# Patient Record
Sex: FEMALE | Race: WHITE | Employment: FULL TIME | URBAN - METROPOLITAN AREA
[De-identification: names, ages, dates, MRNs, and addresses within clinical notes are randomized per-mention and may not be internally consistent; named-entity substitution may affect disease eponyms.]

---

## 2019-08-19 ENCOUNTER — TRANSFERRED RECORDS (OUTPATIENT)
Dept: HEALTH INFORMATION MANAGEMENT | Facility: CLINIC | Age: 44
End: 2019-08-19

## 2019-09-05 ENCOUNTER — TRANSFERRED RECORDS (OUTPATIENT)
Dept: HEALTH INFORMATION MANAGEMENT | Facility: CLINIC | Age: 44
End: 2019-09-05

## 2019-11-18 ENCOUNTER — OFFICE VISIT (OUTPATIENT)
Dept: URGENT CARE | Facility: URGENT CARE | Age: 44
End: 2019-11-18
Payer: COMMERCIAL

## 2019-11-18 VITALS
DIASTOLIC BLOOD PRESSURE: 64 MMHG | BODY MASS INDEX: 21.77 KG/M2 | SYSTOLIC BLOOD PRESSURE: 122 MMHG | WEIGHT: 141 LBS | TEMPERATURE: 97.9 F | HEART RATE: 63 BPM | OXYGEN SATURATION: 99 %

## 2019-11-18 DIAGNOSIS — H53.2 DOUBLE VISION WITH BOTH EYES OPEN: Primary | ICD-10-CM

## 2019-11-18 PROCEDURE — 99202 OFFICE O/P NEW SF 15 MIN: CPT | Performed by: FAMILY MEDICINE

## 2019-11-18 ASSESSMENT — ENCOUNTER SYMPTOMS
SINUS PAIN: 1
DIZZINESS: 1
NECK PAIN: 1
HEADACHES: 1

## 2019-11-18 NOTE — PROGRESS NOTES
"SUBJECTIVE:   Katy Jean is a 44 year old female presenting with a chief complaint of   Chief Complaint   Patient presents with     Urgent Care     Eye Problem     Pt says she's having vision double vision and would like a referral for imaging. Pt says she follows \"homeopathic\" doctor and was told to see an MD.      Headache     Intermittent \"sinus\" headaches with neck stiffness x several months.      As above she has had symptoms that have gone on for several months.  The double vision that she has encountered has been fluctuating they will be times when she has double vision times and she does not,    She has had her eyes checked in the recent past and no abnormalities were found.  She does wear glasses.    Headaches have been ongoing although not intractable.    No lateralizing neurologic signs or symptoms no weakness in her face no weakness in her arms or legs.  She does ambulate without a problem there is.    A history of aneurysms in the family she relates her.    Problems to a previous history of having taken Depo Provera in the past.  Symptoms seem to arise since then she is been seeing a homeopathic physician.  This physician has been keeping her symptoms under control but as of late has discussed her seeing somebody for imaging.  In addition she would like to be seen at the [facility] and there are endocrinology clinic which does seem reasonable.  She is an established patient of [facility].        Review of Systems   HENT: Positive for sinus pain.    Musculoskeletal: Positive for neck pain.   Neurological: Positive for dizziness and headaches.   All other systems reviewed and are negative.      Past Medical History:   Diagnosis Date     Endometriosis      Factor V Leiden (H)      Group B streptococcal infection      No family history on file.  Current Outpatient Medications   Medication Sig Dispense Refill     HYDROCORTISONE PO Take 5 mg by mouth daily       Social History "     Tobacco Use     Smoking status: Never Smoker     Smokeless tobacco: Never Used   Substance Use Topics     Alcohol use: Yes     Comment: RARE       OBJECTIVE  /64   Pulse 63   Temp 97.9  F (36.6  C) (Tympanic)   Wt 64 kg (141 lb)   SpO2 99%   BMI 21.77 kg/m      Physical Exam  Vitals signs and nursing note reviewed.   HENT:      Head: Normocephalic and atraumatic.   Eyes:      Pupils: Pupils are equal, round, and reactive to light.   Neck:      Musculoskeletal: Normal range of motion.   Cardiovascular:      Rate and Rhythm: Normal rate.   Pulmonary:      Effort: Pulmonary effort is normal.      Breath sounds: Normal breath sounds.   Skin:     General: Skin is warm.   Neurological:      General: No focal deficit present.      Mental Status: She is alert and oriented to person, place, and time.      Gait: Gait normal.   Psychiatric:         Mood and Affect: Mood normal.         Behavior: Behavior normal.         Judgment: Judgment normal.         Labs:  No results found for this or any previous visit (from the past 24 hour(s)).    X-Ray was not done.    ASSESSMENT:      ICD-10-CM    1. Double vision with both eyes open H53.2 MR Brain w/o & w Contrast     NEUROLOGY ADULT REFERRAL     ENDOCRINOLOGY ADULT REFERRAL   2.  Headache    44-year-old female with a constellation of symptoms that does make you think they may be from a neurologic basis in addition she does have a history of aneurysms in the family.    In addition she is relating this to her previous hormonal treatment so I do think that in consultation as she has had with her homeopathic physician endocrinology may be good consultation to obtain    It does not appear that she is had any scanning in the past.  She may need an MRI MRA    She wants to be followed at the [facility]    Medical Decision Making:    Differential Diagnosis:  Aneurysm,, intracranial mass, MS    Serious Comorbid Conditions:  Adult:   None    PLAN:        Followup:    1.  [facility] neurology clinic  2 [facility] endocrinology clinic.   3.  Initial radiologic studies to be done through [facility] and at the discretion of the radiologist.    4.  If there is any further concerns please feel free to contact me.

## 2019-11-21 ENCOUNTER — TRANSFERRED RECORDS (OUTPATIENT)
Dept: HEALTH INFORMATION MANAGEMENT | Facility: CLINIC | Age: 44
End: 2019-11-21

## 2019-12-17 NOTE — TELEPHONE ENCOUNTER
RECORDS RECEIVED FROM: internal    DATE RECEIVED: 2.10.20   NOTES (FOR ALL VISITS) STATUS DETAILS   OFFICE NOTES from referring provider Care Everywhere 11.18.19 John Gill MD   OFFICE NOTES from other specialist N/A    ED NOTES N/A    OPERATIVE REPORT  (thyroid, pituitary, adrenal, parathyroid) N/A    MEDICATION LIST Internal    IMAGING      DEXASCAN N/A    MRI (BRAIN) Internal 11/21/19   XR (Chest) N/A    CT (HEAD/NECK/CHEST/ABDOMEN) N/A    NUCLEAR  N/A    ULTRASOUND (HEAD/NECK) N/A    LABS     DIABETES: HBGA1C, CREATININE, FASTING LIPIDS, MICROALBUMIN URINE, POTASSIUM, TSH, T4    THYROID: TSH, T4, CBC, THYRODLONULIN, TOTAL T3, FREE T4, CALCITONIN, CEA N/A

## 2020-01-11 ENCOUNTER — OFFICE VISIT (OUTPATIENT)
Dept: ENDOCRINOLOGY | Facility: CLINIC | Age: 45
End: 2020-01-11

## 2020-01-11 VITALS
BODY MASS INDEX: 21 KG/M2 | HEIGHT: 70 IN | DIASTOLIC BLOOD PRESSURE: 84 MMHG | WEIGHT: 146.7 LBS | SYSTOLIC BLOOD PRESSURE: 127 MMHG | HEART RATE: 68 BPM

## 2020-01-11 DIAGNOSIS — R53.83 FATIGUE, UNSPECIFIED TYPE: ICD-10-CM

## 2020-01-11 DIAGNOSIS — R79.89 LOW SERUM CORTISOL LEVEL: Primary | ICD-10-CM

## 2020-01-11 RX ORDER — ERGOCALCIFEROL 1.25 MG/1
50000 CAPSULE ORAL
COMMUNITY

## 2020-01-11 RX ORDER — CALCIUM CARBONATE/VITAMIN D3 500-10/5ML
LIQUID (ML) ORAL
COMMUNITY

## 2020-01-11 ASSESSMENT — PATIENT HEALTH QUESTIONNAIRE - PHQ9: SUM OF ALL RESPONSES TO PHQ QUESTIONS 1-9: 18

## 2020-01-11 ASSESSMENT — PAIN SCALES - GENERAL: PAINLEVEL: SEVERE PAIN (6)

## 2020-01-11 ASSESSMENT — MIFFLIN-ST. JEOR: SCORE: 1377.74

## 2020-01-11 NOTE — PATIENT INSTRUCTIONS
Go to the lab between 7 and 9 am for a blood draw. Any [facility] lab is okay.  Do not take hydrocortisone for at least 24 hour prior to this testing.                      Behavioral Health Clinician Information    We would like to introduce you to Isaac Urbina PsyD, LP our Behavioral Health Clinician ([facility]).  He works with the neurology, rheumatology, endocrinology, and gastroenterology clinics to help develop and maintain healthy behaviors while addressing any challenges that might get in the way of staying as healthy as possible such as stress, anxiety, and depression.    What will the [facility] add to the services I already receive?    Dr. Urbina uses a brief model of treatment to teach a set of practical skills to cope with stress, worries, or general distress about your health or other life concerns. He and your Physician will together consider the physical, behavioral, and emotional aspects of your health concerns. Dr. Urbina's primary job is to partner with you and your Physician to develop and implement the best integrated care plan for YOU!    Some examples of [facility] support include:    Stress Management  Anxiety/Panic/PTSD/Depression  Alcohol and Drug Concerns  Adjustment to Medical Diagnosis  Grief and Loss  Weight Management   Insomnia  Relationship and Parenting Issues  Chronic Pain  Chronic Disease Management    Who is eligible for services?  The service is available to all patients within the specialty clinics of rheumatology, neurology, endocrinology, and gastroenterology at [facility]. There may be a co-pay involved in some of the services that he provides. You will need to contact your insurance to determine specific coverage amounts and limits.    How do I schedule an appointment with (fill in first name)?  You can call scheduling line for [facility] at [phone] and ask to schedule an appointment with Dr. Urbina. Additionally, you can ask to speak with him while you are at  your clinic appointment. Although he may not always be immediately available, he will make his best effort to meet you before you leave the clinic.

## 2020-01-11 NOTE — PROGRESS NOTES
"Endocrinology and Diabetes Clinic    Consulting provider: John Gill MD  Reason for consultation: Low cortisol     Subjective:   Katy Jean is a 44 year old woman, here for evaluation of a low cortisol level.     She reports feeling well until 7 years ago, when she was treated with Lupron for endometriosis. She received 2 months of therapy. Everything felt different after this treatment. She describes that she had been a high energy, upbeat, high achieving person, but developed exhaustion and other symptoms. Current symptoms include fatigue, trouble with concentration, exercise intolerance (in particular, can't build muscle mass or tone), and joint aches.     She has been working with a Naturopathic Doctor (Franko Solomon, [facility]) and this has been very helpful. She was diagnosed and treated for small intestinal bacterial overgrowth with antibiotics and diet. She notes that diet makes a big difference in all of her symptoms.    She has also been diagnosed with low cortisol and low DHEA-s levels, and for this reason now takes hydrocortisone 10 mg every morning (she is also prescribed 5 mg in the afternoon, not currently taking). She also took DHEA for 3 months, but testosterone was high and she stopped taking this in September. In the past she has also stopped hydrocortisone and did not feel any worse without this medication.     She and her physician are worried about pituitary apoplexy as a side effect of Lupron. 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 This did not show any pituitary abnormality and was read as \"normal for age\" (result in scanned media).    Recent notes and lab results from [facility] were reviewed; available in scanned media.     Review of Systems:  Periods - regular until December, she did have a breakthrough period in December  Stretch marks - no "   Hyperpigmentation - no, but skin blothcy   Thin dry skin   Getting dark hairs on chin, no where else   Pubic hair normal for her  No axillary hair   Losing hair from head   Vision - staring at a computer screen and stress give her double vision and blurry vision; no difficulty with peripheral vision.   A comprehensive ROS was otherwise negative except as noted in HPI.     Medications:   Hydrocortisone 10 mg daily  Magnesium supplement  Vitamin D 97762 units once per week     Past Medical History:   Past Medical History:   Diagnosis Date     Endometriosis      Factor V Leiden (H)      Group B streptococcal infection      Past Surgical History:  Spinal fusion as teenager   Ovary removed   Laporoscopic surgeries for endometriosis   Knee surgery   Bilateral rhizotomy for back pain after surgery   Past Surgical History:   Procedure Laterality Date     ARTHROSCOPY KNEE Right      BREAST SURGERY       C ANESTH,LUMBAR SPINE,CORD SURGERY       DILATION AND CURETTAGE, OPERATIVE HYSTEROSCOPY WITH MORCELLATOR, COMBINED N/A 7/10/2015    Procedure: COMBINED DILATION AND CURETTAGE, OPERATIVE HYSTEROSCOPY WITH MORCELLATOR;  Surgeon: Deirdre Pinto MD;  Location: [facility]     EXTRACTION(S) DENTAL       GYN SURGERY       HERNIA REPAIR       LAPAROSCOPIC CYSTECTOMY OVARIAN (BENIGN) Left 7/10/2015    Procedure: LAPAROSCOPIC CYSTECTOMY OVARIAN (BENIGN);  Surgeon: Deirdre Pinto MD;  Location: [facility]     LAPAROSCOPIC LYSIS ADHESIONS  7/10/2015    Procedure: LAPAROSCOPIC LYSIS ADHESIONS;  Surgeon: Deirdre Pinto MD;  Location:  [facility]     LAPAROSCOPY       radiofrequency ablation of left greater saphenous vein       TONSILLECTOMY       Allergies:   No Known Allergies    Social History:   for [employer]   Degree in exercise physiology  Mom of 3 kids, ages 16, 14, 12  Social History     Tobacco Use     Smoking status: Never Smoker     Smokeless tobacco: Never Used   Substance Use Topics     Alcohol use: Yes     Comment:  "RARE     Family History:  No pertinent family history.     Physical Examination:  Blood pressure 127/84, pulse 68, height 1.765 m (5' 9.5\"), weight 66.5 kg (146 lb 11.2 oz), not currently breastfeeding.  Body mass index is 21.35 kg/m .    Wt Readings from Last 4 Encounters:   01/11/20 66.5 kg (146 lb 11.2 oz)   11/18/19 64 kg (141 lb)   07/10/15 62.6 kg (138 lb)     General: Well appearing and in no distress.   Eyes: EOMI. Sclerae and conjunctivae are clear. Visual fields are intact to confrontation.   HENT: No thyromegaly or mass.    Lymphatic: No cervical lymphadenopathy.  Cardiovascular: RRR, with normal S1+S2 and no murmurs.   Respiratory: Lungs are clear to auscultation.   Gastrointestinal: Abdomen is soft, non tender, and non-distended.   Skin: No abdominal striae. No hyperpigmentation of skin folds or buccal mucosa. No hirsuit features.   Extremities: No peripheral edema.   Neurologic: No tremor with hands outstretched. 2+ patellar reflexes.     Labs and Studies:   Recent outside lab work reviewed, available in scanned media.     There was also an endocrine workup in 2014 in [facility] - no consult note available and context of testing is not clear. Cortisol was 7.5 with ACTH 10. Cortisol at a later date was <1.0 (possibly a dex suppression test?). TSH low with normal free T4.     MRI result reviewed.     Assessment:  1. Low cortisol level on outside testing.   2. Low DHEA-s level.   3. Previous treatment with Lupron, prior to onset of current symptoms.  4. Vitamin D deficiency, which is treated.   5. High scores on depression and anxiety screens today. Will meet with nurse following this appointment per protocol. She was offered a referral to health psychology but did not feel this would be helpful at the current time. She attributes high scores to current physical symptoms.     Plan:   Will repeat cortisol testing and obtain a full panel of pituitary labs to screen for secondary adrenal insufficiency or " other pituitary dysfunction. Instructed she must hold hydrocortisone for 24 hours prior to this testing and should come to the lab in the early morning.   Further workup to be based on results. I will contact her when results return.     Porsha Alexis MD   Diabetes and Endocrinology   [phone]

## 2020-01-11 NOTE — NURSING NOTE
Depression Response    Patient completed the PHQ-9 assessment for depression and scored >9? No  Question 9 on the PHQ-9 was positive for suicidality? No    I personally notified the following: clinic nurse jem

## 2020-01-11 NOTE — NURSING NOTE
[facility]:  PHQ-9 Screening Note  SITUATION/BACKGROUND                                                    Katy Jean is a 44 year old female who completed the PHQ-9 assessment for depression and Score is >9.    Onset of symptoms: gradual  Trigger: 7-8 years ago was on a medication that has effected her hormones  Prior history of suicide attempt or self harm: No  Risk Factors: access to firearm  History of depression or mental illness: No  Medications reviewed: No     ASSESSMENT      A. Are any of the following present?      Suicidal thoughts with a plan and means to carry out the plan?    Intent to harm others    Altered mental status: confusion, delusional, psychotic NO - go to B   B. Are any of the following present?      Suicidal thoughts without a plan or means to carry out the plan    New onset of delusional ideas    Past inpatient admission for depression    New onset and recent change or addition of new medication NO - go to C   C. Are any of the following present?      Previous suicide attempts    Depression interfering with ability to work or function    Loss of appetite and eating poorly    Abrupt cessation of drugs (OTC or RX), alcohol or caffeine    Drug or alcohol abuse NO - go to D   D. Are several of the following present?      Difficulty concentrating    Difficulty sleeping    Reduced interest in sexual activity or impotency    Irregular or absent menstruation    No interest in activity    Change in interpersonal relationships    Increased use/abuse of alcohol or drugs    Pregnant or recent child birth    Recent major life change    History of depression NO - provide home care instructions         BEHAVIORAL HEALTH TEAMS      [facility] - Behavioral Health Team    [facility] Pager: [phone]    [place]  - Behavioral Health Team    Pager number: [phone]    Referral to Behavioral Health    BEHAVIORAL / SPIRITUAL HEALTH SOWQ [[id number]}    Jackie Garzon,  RN        Met with patient face to face in exam room to review PHQ-9 score. Patient denies suicidal thoughts or ideations. Patient reports that she wanted to be honest on the PHQ test but states that she does not feel she is depressed. Patient notes that her hopelessness has to do with her on going fatigue from her medical issues. Patient states that she has much hope other than her medical concerns. Patient states that she has a wonderful family, 3 teenage sons and a .  Patient stated that around 7-8 years ago she was put on depo-lupron for endometriosis and ever since her hormones have been off. Patient notes that she is constantly fatigued and will forget things while managing her family's schedules. Patient reports that her best outlet for stress management is exercise but she has not been able to do much exercise due to the fatigue.     Reviewed with patient option of Behavioral Health Clinician. Discussed with patient what a [facility] can provide for patient. She verbalizes understanding and notes that this time she would prefer to wait to see how her tests turn out and what Dr. Alexis's recommendations will be. Patient notes that she is not opposed and was ok with nurse providing information for [place] [facility]ician (see patient instructions).     Writer advised patient that nursing staff at [facility] will be updated on plan. Patient verbalizes understanding and agrees to plan. [facility] with any questions or concerns.           Taylor Garzon RN  Endocrine Care Coordinator  [facility]      Copyright 2016 [employer]

## 2020-01-14 ENCOUNTER — TELEPHONE (OUTPATIENT)
Dept: ENDOCRINOLOGY | Facility: CLINIC | Age: 45
End: 2020-01-14

## 2020-01-14 DIAGNOSIS — R79.89 LOW SERUM CORTISOL LEVEL: ICD-10-CM

## 2020-01-14 DIAGNOSIS — R53.83 FATIGUE, UNSPECIFIED TYPE: Primary | ICD-10-CM

## 2020-01-14 NOTE — TELEPHONE ENCOUNTER
----- Message from Jackie Garzon RN sent at 1/11/2020 10:34 AM CST -----  Hello ladies!    Just a follow-up on this patient. Please refer to my nursing note. I went in and talked to patient today regarding her PHQ-9 score. Patient did not want any further resources at this time. She wanted to wait to see how her tests turned out. I was not sure if you followed up with these patients.    Thank you!!!  Taylor

## 2020-01-14 NOTE — TELEPHONE ENCOUNTER
I called alicia to  see how she was doing and if she needed help in scheduling the labs. The orders  Have  pending future on them so the  lab had told her they were not approved by Dr Alexis.. I don't know how to change this status so  Ordered new ones with future   . I entered all the labs again on FUTURE so she can come in soon in the AM to have them done . She has been off the hydrocortisone  over 24 hours. Becki Garduno RN on 1/14/2020 at 2:08 PM

## 2020-01-16 DIAGNOSIS — R79.89 LOW SERUM CORTISOL LEVEL: ICD-10-CM

## 2020-01-16 DIAGNOSIS — R53.83 FATIGUE, UNSPECIFIED TYPE: ICD-10-CM

## 2020-01-16 LAB
ALBUMIN SERPL-MCNC: 3.5 G/DL (ref 3.4–5)
ALP SERPL-CCNC: 42 U/L (ref 40–150)
ALT SERPL W P-5'-P-CCNC: 19 U/L (ref 0–50)
ANION GAP SERPL CALCULATED.3IONS-SCNC: 5 MMOL/L (ref 3–14)
AST SERPL W P-5'-P-CCNC: 13 U/L (ref 0–45)
BILIRUB SERPL-MCNC: 0.5 MG/DL (ref 0.2–1.3)
BUN SERPL-MCNC: 19 MG/DL (ref 7–30)
CALCIUM SERPL-MCNC: 9.2 MG/DL (ref 8.5–10.1)
CHLORIDE SERPL-SCNC: 101 MMOL/L (ref 94–109)
CO2 SERPL-SCNC: 32 MMOL/L (ref 20–32)
CORTIS SERPL-MCNC: 8 UG/DL (ref 4–22)
CREAT SERPL-MCNC: 0.86 MG/DL (ref 0.52–1.04)
ESTRADIOL SERPL-MCNC: 127 PG/ML
FSH SERPL-ACNC: 30.8 IU/L
GFR SERPL CREATININE-BSD FRML MDRD: 81 ML/MIN/{1.73_M2}
GLUCOSE SERPL-MCNC: 91 MG/DL (ref 70–99)
LH SERPL-ACNC: 22.9 IU/L
POTASSIUM SERPL-SCNC: 3.6 MMOL/L (ref 3.4–5.3)
PROT SERPL-MCNC: 6.7 G/DL (ref 6.8–8.8)
SODIUM SERPL-SCNC: 138 MMOL/L (ref 133–144)
T4 FREE SERPL-MCNC: 0.92 NG/DL (ref 0.76–1.46)
TSH SERPL DL<=0.005 MIU/L-ACNC: 2.13 MU/L (ref 0.4–4)

## 2020-01-16 PROCEDURE — 82024 ASSAY OF ACTH: CPT | Performed by: INTERNAL MEDICINE

## 2020-01-16 PROCEDURE — 82533 TOTAL CORTISOL: CPT | Performed by: INTERNAL MEDICINE

## 2020-01-16 PROCEDURE — 84443 ASSAY THYROID STIM HORMONE: CPT | Performed by: INTERNAL MEDICINE

## 2020-01-16 PROCEDURE — 84439 ASSAY OF FREE THYROXINE: CPT | Performed by: INTERNAL MEDICINE

## 2020-01-16 PROCEDURE — 82627 DEHYDROEPIANDROSTERONE: CPT | Performed by: INTERNAL MEDICINE

## 2020-01-16 PROCEDURE — 80053 COMPREHEN METABOLIC PANEL: CPT | Performed by: INTERNAL MEDICINE

## 2020-01-16 PROCEDURE — 84305 ASSAY OF SOMATOMEDIN: CPT | Performed by: INTERNAL MEDICINE

## 2020-01-16 PROCEDURE — 83001 ASSAY OF GONADOTROPIN (FSH): CPT | Performed by: INTERNAL MEDICINE

## 2020-01-16 PROCEDURE — 82670 ASSAY OF TOTAL ESTRADIOL: CPT | Performed by: INTERNAL MEDICINE

## 2020-01-16 PROCEDURE — 36415 COLL VENOUS BLD VENIPUNCTURE: CPT | Performed by: INTERNAL MEDICINE

## 2020-01-16 PROCEDURE — 83002 ASSAY OF GONADOTROPIN (LH): CPT | Performed by: INTERNAL MEDICINE

## 2020-01-17 LAB
ACTH PLAS-MCNC: <10 PG/ML
DHEA-S SERPL-MCNC: 26 UG/DL (ref 35–430)
IGF-I BLD-MCNC: 233 NG/ML (ref 64–246)

## 2020-01-23 ENCOUNTER — TELEPHONE (OUTPATIENT)
Dept: ENDOCRINOLOGY | Facility: CLINIC | Age: 45
End: 2020-01-23

## 2020-01-23 DIAGNOSIS — E27.40 ADRENAL INSUFFICIENCY (H): ICD-10-CM

## 2020-01-23 DIAGNOSIS — R79.89 LOW SERUM ADRENOCORTICOTROPHIC HORMONE (ACTH): ICD-10-CM

## 2020-01-23 DIAGNOSIS — R79.89 LOW SERUM CORTISOL LEVEL: Primary | ICD-10-CM

## 2020-01-23 NOTE — TELEPHONE ENCOUNTER
Attempted to contact patient to discuss lab results; left message.   Porsha Alexis MD  Endocrinology and Diabetes   [phone]  Component      Latest Ref Rng & Units 1/16/2020   Sodium      133 - 144 mmol/L 138   Potassium      3.4 - 5.3 mmol/L 3.6   Chloride      94 - 109 mmol/L 101   Carbon Dioxide      20 - 32 mmol/L 32   Anion Gap      3 - 14 mmol/L 5   Glucose      70 - 99 mg/dL 91   Urea Nitrogen      7 - 30 mg/dL 19   Creatinine      0.52 - 1.04 mg/dL 0.86   GFR Estimate      >60 mL/min/1.73:m2 81   GFR Estimate If Black      >60 mL/min/1.73:m2 >90   Calcium      8.5 - 10.1 mg/dL 9.2   Bilirubin Total      0.2 - 1.3 mg/dL 0.5   Albumin      3.4 - 5.0 g/dL 3.5   Protein Total      6.8 - 8.8 g/dL 6.7 (L)   Alkaline Phosphatase      40 - 150 U/L 42   ALT      0 - 50 U/L 19   AST      0 - 45 U/L 13   T4 Free      0.76 - 1.46 ng/dL 0.92   TSH      0.40 - 4.00 mU/L 2.13   DHEA Sulfate      35 - 430 ug/dL 26 (L)   Ins Growth Factor 1      64 - 246 ng/ml 233   FSH      IU/L 30.8   Lutropin      IU/L 22.9   Estradiol      pg/mL 127   Adrenal Corticotropin      <47 pg/mL <10   Cortisol Serum      4 - 22 ug/dL 8.0

## 2020-01-29 PROBLEM — E27.40 ADRENAL INSUFFICIENCY (H): Status: ACTIVE | Noted: 2020-01-29

## 2020-01-29 PROBLEM — R79.89 LOW SERUM ADRENOCORTICOTROPHIC HORMONE (ACTH): Status: ACTIVE | Noted: 2020-01-29

## 2020-01-29 PROBLEM — R79.89 LOW SERUM CORTISOL LEVEL: Status: ACTIVE | Noted: 2020-01-29

## 2020-01-29 RX ORDER — COSYNTROPIN 0.25 MG/ML
250 INJECTION, POWDER, FOR SOLUTION INTRAMUSCULAR; INTRAVENOUS ONCE
Status: CANCELLED | OUTPATIENT
Start: 2020-01-29

## 2020-01-29 NOTE — TELEPHONE ENCOUNTER
Spoke with patient today. Reviewed lab results. Next step will be ACTH stim test. All questions answered.  Of note, she did not restart her hydrocortisone and actually feels better without this medication.   She also has noticed that symptoms seem related to menstrual cycles.   I confirmed medication list - vitamin D and magnesium. No opiates.   Porsha Alexis MD  Endocrinology and Diabetes   [phone]

## 2020-01-31 ENCOUNTER — INFUSION THERAPY VISIT (OUTPATIENT)
Dept: INFUSION THERAPY | Facility: CLINIC | Age: 45
End: 2020-01-31
Attending: INTERNAL MEDICINE
Payer: COMMERCIAL

## 2020-01-31 VITALS
HEART RATE: 53 BPM | TEMPERATURE: 98.2 F | DIASTOLIC BLOOD PRESSURE: 72 MMHG | RESPIRATION RATE: 16 BRPM | SYSTOLIC BLOOD PRESSURE: 108 MMHG

## 2020-01-31 DIAGNOSIS — E27.40 ADRENAL INSUFFICIENCY (H): ICD-10-CM

## 2020-01-31 DIAGNOSIS — R79.89 LOW SERUM ADRENOCORTICOTROPHIC HORMONE (ACTH): ICD-10-CM

## 2020-01-31 DIAGNOSIS — R79.89 LOW SERUM CORTISOL LEVEL: Primary | ICD-10-CM

## 2020-01-31 LAB
CORTICOSTER 1H P 250 UG ACTH SERPL-SCNC: 28 UG/DL
CORTICOSTER 30M P 250 UG ACTH SERPL-SCNC: 23 UG/DL
CORTICOSTER SERPL-MCNC: 6.4 UG/DL (ref 4–22)
PROLACTIN SERPL-MCNC: 11 UG/L (ref 3–27)

## 2020-01-31 PROCEDURE — 82024 ASSAY OF ACTH: CPT | Performed by: INTERNAL MEDICINE

## 2020-01-31 PROCEDURE — 36415 COLL VENOUS BLD VENIPUNCTURE: CPT

## 2020-01-31 PROCEDURE — 96374 THER/PROPH/DIAG INJ IV PUSH: CPT

## 2020-01-31 PROCEDURE — 25000128 H RX IP 250 OP 636: Mod: ZF | Performed by: INTERNAL MEDICINE

## 2020-01-31 PROCEDURE — 84146 ASSAY OF PROLACTIN: CPT | Performed by: INTERNAL MEDICINE

## 2020-01-31 PROCEDURE — 82533 TOTAL CORTISOL: CPT | Performed by: INTERNAL MEDICINE

## 2020-01-31 RX ORDER — COSYNTROPIN 0.25 MG/ML
250 INJECTION, POWDER, FOR SOLUTION INTRAMUSCULAR; INTRAVENOUS ONCE
Status: COMPLETED | OUTPATIENT
Start: 2020-01-31 | End: 2020-01-31

## 2020-01-31 RX ORDER — COSYNTROPIN 0.25 MG/ML
250 INJECTION, POWDER, FOR SOLUTION INTRAMUSCULAR; INTRAVENOUS ONCE
Status: CANCELLED | OUTPATIENT
Start: 2020-01-31

## 2020-01-31 RX ADMIN — COSYNTROPIN 250 MCG: 0.25 INJECTION, POWDER, LYOPHILIZED, FOR SOLUTION INTRAVENOUS at 09:12

## 2020-01-31 NOTE — PATIENT INSTRUCTIONS
Dear Katy Jean    Thank you for choosing [facility] ([facility]) for your ACTH blood testing.  The following information is a summary of our appointment as well as important reminders.      We look forward in seeing you on your next appointment here at [facility]).  Please don t hesitate to call us at [phone] to reschedule any of your appointments or to speak with one of the [facility] registered nurses.  It was a pleasure taking care of you today.    Sincerely,    [facility]  [address] [place] [address]  [id number]  Phone:  [phone]  Patient Education     ACTH (Blood)  Does this test have other names?  Adrenocorticotropic hormone blood test, corticotropin  What is this test?  This is a blood test that measures the amount of adrenocorticotropic hormone (ACTH) the pituitary gland produces. This gland is a tiny organ that sits just below your brain. It secretes adrenocorticotropic hormone, which controls the production of another hormone called cortisol.  Cortisol is made by your adrenal glands, which are located at the top of your kidneys. Cortisol helps break down protein, sugar, and fat in your food. It also helps to regulate your blood pressure and your body's ability to fight infection. Cortisol is also one of the hormones that helps you deal with stress. Cortisol levels should peak in the morning and be at their lowest in the evening.  Why do I need this test?  You might have this test if your healthcare provider suspects you have hormone problems. This test is used along with other tests to diagnose conditions, including:    Cushing disease, in which the pituitary gland makes too much ACTH    Cushing syndrome, in which the adrenal glands make too much cortisol    Colton disease, in which the adrenal glands don't make enough  cortisol    Hypopituitarism, a disorder of the pituitary gland that keeps it from producing enough vital hormones  What other tests might I have along with this test?  Your healthcare provider also might order tests to measure your cortisol levels, including:    Adrenocorticotropic hormone stimulation test to determine why your adrenal glands aren't working properly    Dexamethasone suppression test to find an underlying reason for Cushing    Adrenocorticotropic hormone stimulation with metyrapone test also to find an underlying reason for Cushing  What do my test results mean?  Many things may affect your lab test results. These include the method each lab uses to do the test. Even if your test results are different from the normal value, you may not have a problem. To learn what the results mean for you, talk with your healthcare provider.  ACTH is measured in picograms per milliliter (pg/mL). Test results are influenced by the time of day the test was done. Normal results are:    Adults: 6-76 pg/ml (1.3-16.7 pmol/L)  If your ACTH level is low or high you may have Cushing syndrome. If your ACTH level is high you may have Aguada disease. A low ACTH level can also be an indication of hypopituitarism.  How is this test done?  The test requires a blood sample, which is drawn through a needle from a vein in your arm.  You may have blood drawn in the morning and in the afternoon or evening. This is to check for variations in your levels of ACTH hormone.  Does this test pose any risks?  Taking a blood sample with a needle carries risks that include bleeding, infection, bruising, or feeling dizzy. When the needle pricks your arm, you may feel a slight stinging sensation or pain. Afterward, the site may be slightly sore.  What might affect my test results?  Your test results might be affected if you:    Are under a great deal of stress    Recently had a trauma    Are menstruating or pregnant    Are taking certain  medicines, including steroids, hormones, or insulin    Did not sleep well the night before the test  How do I get ready for this test?  Don't eat after midnight on the day of your test. Get a good night's sleep. Follow any other directions from your healthcare provider. Be sure your healthcare provider knows about all medicines, herbs, vitamins, and supplements you are taking. This includes medicines that don't need a prescription and any illicit drugs you may use.    7184-2454 [employer]. [address] [place] [address], [place] [address]. All rights reserved. This information is not intended as a substitute for professional medical care. Always follow your healthcare professional's instructions.

## 2020-01-31 NOTE — PROGRESS NOTES
Cosyntropin Stimulation Study Nursing Note    Katy Jean comes to [facility] today for a ACTH timed test. Pt's vitals recorded  and entered into flow sheet. Medications recorded on MAR. Access recorded in flow sheet.    Progress Note  Vitals were reviewed   Patient tolerated the procedure well    Note: RN provided patient with educational handout regarding timed test.  RN confirmed patient did not take steroids (hydrocortisone) on the morning of the test. PIV placed and  baseline labs drawn. RN administered Cortrosyn per IV push followed by 2 mls NS. Cortisol labs drawn again at 30 minutes and 60 minutes. Following test patient instructed to take usual dose of hydrocortisone for the day.    Medication given:  Administrations This Visit     cosyntropin (CORTROSYN) injection 250 mcg     Admin Date  01/31/2020 Action  Given Dose  250 mcg Route  Intravenous Administered By  Patricia Lovnig RN              Discharge Plan  Discharge instructions reviewed with patient: YES  Patient/Representative verbalized understanding, all questions answered: YES  Discharged from unit at 1030 with whom: self to home.    Patricia Loving RN

## 2020-02-04 LAB — ACTH PLAS-MCNC: <10 PG/ML

## 2020-02-10 ENCOUNTER — PRE VISIT (OUTPATIENT)
Dept: ENDOCRINOLOGY | Facility: CLINIC | Age: 45
End: 2020-02-10

## 2020-02-16 ENCOUNTER — HEALTH MAINTENANCE LETTER (OUTPATIENT)
Age: 45
End: 2020-02-16

## 2020-06-11 ENCOUNTER — TELEPHONE (OUTPATIENT)
Dept: OTHER | Facility: OUTPATIENT CENTER | Age: 45
End: 2020-06-11

## 2020-06-11 NOTE — TELEPHONE ENCOUNTER
LVM regarding therapy plan discussed with spouse in session yesterday. Reviewed clinic's philosophy on working with partners and offered pt the opportunity to answer questions about her upcoming appointment.

## 2020-06-22 ENCOUNTER — VIRTUAL VISIT (OUTPATIENT)
Dept: OTHER | Facility: OUTPATIENT CENTER | Age: 45
End: 2020-06-22
Payer: COMMERCIAL

## 2020-06-22 DIAGNOSIS — F43.23 ADJUSTMENT DISORDER WITH MIXED ANXIETY AND DEPRESSED MOOD: Primary | ICD-10-CM

## 2020-06-23 NOTE — PROGRESS NOTES
[facility]      Program in Human Sexuality  Department of Family Medicine & Community Health  [facility]   [address]               [place] [address]  Phone: [phone]  Fax: [phone]  [email or web address]    Video start time: 3:33 pm  Video end time: 4:28 pm    There were 4 disconnections during this session. It took a few minutes to reconnect each time--which made the flow of the diagnostic difficult. Also could not get reconnected and to use doxy for the final 25 minutes. During the doxy portion there was a lot of background/interference noise which also made the session difficult. Thus not much of the actual diagnostic assessment questions were completed.    Telemedicine Visit: The patient's condition can be safely assessed and treated via synchronous audio and visual telemedicine encounter.      Reason for Telemedicine Visit: Covid-19    Originating Site (Patient Location): Patient's home    Distant Site (Provider Location): Provider Remote Setting    Consent:  The patient/guardian has verbally consented to: the potential risks and benefits of telemedicine (video visit) versus in person care; bill my insurance or make self-payment for services provided; and responsibility for payment of non-covered services.     Mode of Communication:  Video Conference via [employer]    As the provider I attest to compliance with applicable laws and regulations related to telemedicine.          General Diagnostic Assessment Interview    Note: All information described in this report has been directly reported by the patient in the session(s) (unless specifically noted) except for Strengths & Liabilities, Mental Status, Multi-Axial Diagnosis, and Conclusions/Recommendations/Initial Treatment Goals.     Limits of confidentiality were reviewed with the client. She  verbally acknowledged understanding these limits.      Date of Service: 6/22/20  Client  Name: Katy Jean  YOB: 1975  45 year old  MRN:  [id number]  Gender/Gender Identity: female/feminine  Treating Provider: Michelle Guidry PsyD, LP  Program:  [facility]  Type of Session: Assessment  Present in Session: client  Number of Minutes:  55      Ethnicity:    Any relevant cultural/Yazidism issues?  none       Referral Source none    Chief Complaint/Presenting Problem and Goals:  History of Presenting Problem/Illness:    Client shared that she was referred to see me by her 's primary therapist at [facility].  Client seemed quite angry about having this appointment and so some time was spent discussing this issue.  She shared she did not feel like there was a discussion she was just told to schedule with me.  She shared she has been frustrated with therapy because when her  sees therapist he cons them and then no changes are made.  She shared how she did not feel good about the meeting with her  and will if the recent therapist.  She now knows her  is being transferred to a new therapist and she is concerned about this continuing pattern.  Discussed with client that she is not obligated to do therapy with me or anyone else at the clinic.  Discussed the purpose of a partner seeing someone for therapy to assist in addressing the impact of the compulsive sexual behavior on the partner.  She talked about her missed trust with the program because her  has not done any of the work recommended to him.  She shared about him spending a week with Dr. Trevizo while at his program and when he came back the agreement was to work on these assignments every day.  She shared that he has not worked on assignments at all.  She shared that promise also was that he would move out of the home when he returned but when he returned he told her that he was not leaving and if she left he had ways to control the money.  She shared some about previous couples therapy  where she felt like no progress was made because he was not willing to change.    She indicated starting to have concerns about his sexual behavior in 2004.  Regarding his sexual behaviors she is aware of he has had affairs and sexual partners.  She does not believe that she knows all of the information about what he has done and he is defensive when she talks about it with him.  She is not sure she wants to continue in the marriage but she is not sure that she will be able to get out of the relationship either.  She shared it is difficult because they have 3 children and now with the pandemic it has affected everyone in the household with having to work from home.          Mental Health History:     Client is engaged in previous mental health therapy with both individual therapy and couples therapy in the past.  The last time she did couples therapy was fall 2019 and that did not last very long.  She shared she gets quite frustrated doing couples therapy because her  can be charming and turns everything around on her.  She shared she has never been on any medications for mental health treatment.  She has no history of hospitalizations.  No suicide ideation or attempts in the past or present.  Her current symptoms of anxiety and depression seem to specifically relate to the distress in the relationship with her 's pattern of sexual behaviors and her feeling intimidated when she interacts with him.    Substance Use:     Client indicated having an alcoholic beverage about 1 time every 2 weeks.  At times she may have 2 drinks when she drinks.  She does not use any mood altering drugs.  She has never had any concern about her drug or alcohol use.  No one has expressed concern about her alcohol use in the past.  There is a family history of alcoholism with her maternal grandfather.  There is no other family history of alcohol or drug addiction.      Medical History:     Client indicated that she has  endometriosis.  They tried to treat it with some medication but she had side effects from the medication.  She is also experienced adrenal fatigue.  She has some additional health issues and she relates all this to the stress of being in this relationship and her 's pattern of behaviors.    Relationships/Social History  Family History:     Client was reared by her biological parents in a [place] town in [place] area in [place].  She has 2 siblings an older and younger brother.  Her father  of heart attack/aneurysm about 5 years ago.  He was 67 years old at that time.  She described having a loving relationship with her father.  She described him as being firm but always these caring.  She shared there were some difficulties with when she was between ages 11 and 13 but for the most part they always got along well.  Her mom, age 70, was also from the loving.  She shared they have a close connection in great relationship.  She has good relationships with both of her brothers.  She has regular contact with family members.  She indicated no abuse within her family system.    After high school she completed a bachelor's degree in exercise physiology at [employer].    Client indicated to having a couple of relationships that lasted about 3 years prior to meeting her  in about .  They were  a couple of years later and she had started having concerns about his behavior in .  She would start to find women's underwear, he would be gone many hours of the day and night, and would be defensive when she would asked about it.  She thinks he was being sexual with other women and possibly some of these were long-term affairs.  She shared that he is always liked going to strip clubs.  She also knew that he was looking at pornography frequently.  She shared he was never available and was never home.  He always spent time with his friends and she thinks now it is other women as  well.  She shared she also has concerns about his alcohol use as she sees him drinking a lot.  She sees a connection between his alcohol use and his seeking other women.    They have 3 children a son age 16, a daughter age 15, and another son age 12.  She described having a good relationship with her children and stated that in general her children have an okay relationship with her father.  She witnesses him arguing with them frequently.      Educational History client has a bachelor's degree in exercise physiology.  Occupational history information was not gathered about her current employment.      Legal Issues none          CONCLUSIONS    Strengths and Liabilities:     Client described her strengths as being empathic in being able to take others perspective.  She also is persistent and determined.  She described her limitations as her temper and difficulty with patience.    Symptoms:      Mental Status:   Appearance:  Distressed, Casually dressed and Well groomed  Behavior/relationship to examiner/demeanor:  Cooperative and Resistant  Orientation: Oriented to person, place, time and situation  Speech Rate:  Normal  Speech Spontaneity:  Normal  Mood:  frustrated, anxious and apprehensive  Affect:  Appropriate/mood-congruent  Thought Process (Associations):  Logical  Thought Content:  no evidence of suicidal or homicidal ideation and no overt psychosis  Abnormal Perception:  None  Attention/Concentration:  Normal  Language:  Intact  Insight:  Good  Judgment:  Good    Motor activity/EPS:  Normal      DSM-5 Diagnosis:    Encounter Diagnosis   Name Primary?     Adjustment disorder with mixed anxiety and depressed mood Yes         Conclusions/Recommendations/Initial Treatment Goals:   Katy Jean is a 45 year old American person assigned female at birth who identifies as female who [facility] regarding relationship distress related to her 's  pattern of engaging in sexual behavior outside the relationship.  She also has felt intimidated by him with his expression of anger.  She was very reluctant and resistant to engaging in the therapy process during the initial part of this interview.  She expressed concern about the treatment her  was getting as she sees him as being able to con therapists.  She shared not wanting to engage in therapy again to do work when there are no changes.  Discussed the purpose of our therapy is to help her look at the impact of this behavior on her and assist her in making decisions regarding her relationship.  Generally described the philosophy of the program.  She then seemed more open to engaging in shared information with me.  Client has no history of anxiety and depression or mental health diagnoses.  She has experienced some medical issues as a result of the stress she experiences in her relationship.  Individual therapy is recommended to address the impact this relationship has had on her.  Also was assisted client in creating and communicating boundaries in her relationship.        Interactive Complexity  Communication difficulties present during current the psychiatric procedure included:    1. N/A    Michelle Guidry PsyD, LP

## 2020-06-25 ENCOUNTER — VIRTUAL VISIT (OUTPATIENT)
Dept: OTHER | Facility: OUTPATIENT CENTER | Age: 45
End: 2020-06-25
Payer: COMMERCIAL

## 2020-06-25 DIAGNOSIS — F43.23 ADJUSTMENT DISORDER WITH MIXED ANXIETY AND DEPRESSED MOOD: Primary | ICD-10-CM

## 2020-06-29 NOTE — PROGRESS NOTES
Video start time:5:02 pm Had continuous video and audio problems.  Video end time: 5:30 pm the rest of session was done by phone. 5:30 pm to 6:00 pm    Telemedicine Visit: The patient's condition can be safely assessed and treated via synchronous audio and visual telemedicine encounter.      Reason for Telemedicine Visit: Covid 19    Originating Site (Patient Location): Patient's home    Distant Site (Provider Location): Provider Remote Setting    Consent:  The patient/guardian has verbally consented to: the potential risks and benefits of telemedicine (video visit) versus in person care; bill my insurance or make self-payment for services provided; and responsibility for payment of non-covered services.     Mode of Communication:  Video Conference via [employer]    As the provider I attest to compliance with applicable laws and regulations related to telemedicine.      [facility] -  Case Progress Note    Date of Service: Jun 25, 2020  Client Name: Katy Jean  YOB: 1975  MRN:  [id number]  Treating Provider: Michelle Guidry LP  Type of Session: Individual  Present in Session: client  Number of Minutes:  58    Current Symptoms/Status:    Anxiety, worry, distressing conflict and marital relationship, mistrust of her , distress about his long-term pattern of engaging in sexual behavior outside the relationship, anxiety and fear about his expression of anger if she does something he does not like.    Progress Toward Treatment Goals:   Started working on treatment plan.    Intervention: Modality and Description:  Provided support and feedback.  Used CBT to process the diagnostic session and to discuss treatment recommendations.  Client shared that her  has his first session with a new therapist Friday and she is supposed to attend that session.  She shared being anxious about attending the sessions because she feels like her  lives to therapists and  they believe him.  We discussed what she felt was important to share at this meeting.  She brought up some of the topics and assisted her in reframing these so that she can talk about specific behaviors and incidents.  Discussed with her there is no guarantee that he will change.  Reviewed that her purpose is to communicate with things she needs to and then to observe how he responds to this information.  If he makes changes that will be clear and obvious and if he does not she will need to make decisions about where she wants to go with this relationship and self-care.  We also discussed her decision about continuing with ongoing therapy.  She said at this time she is willing to engage in therapy.    Response to Intervention:  Client reported gaining insight and validation.    Assignment:  Share her concerns and session with 's therapist.    Interactive Complexity:  There are four specific communication difficulties that complicate the work of the primary psychiatric procedure.  Interactive complexity (+[id number]) may be reported when at least one of these difficulties is present.    Communication difficulties present during current the psychiatric procedure include:  1. None.    Diagnosis:  Encounter Diagnosis   Name Primary?     Adjustment disorder with mixed anxiety and depressed mood Yes         Plan / Need for Future Services:  Return for therapy in 1-2 weeks.      Michelle Guidry PsyD, LP

## 2020-07-01 ENCOUNTER — VIRTUAL VISIT (OUTPATIENT)
Dept: OTHER | Facility: OUTPATIENT CENTER | Age: 45
End: 2020-07-01
Payer: COMMERCIAL

## 2020-07-01 DIAGNOSIS — F43.23 ADJUSTMENT DISORDER WITH MIXED ANXIETY AND DEPRESSED MOOD: Primary | ICD-10-CM

## 2020-07-02 NOTE — PROGRESS NOTES
[facility] Sexual Health -  Case Progress Note      Client Name: Katy Jean  YOB: 1975  MRN:  [id number]  Treating Provider: Michelle Guidry LP  Type of Session:   Video start time:11:02 am  Video end time: 11:57 am    Telemedicine Visit: The patient's condition can be safely assessed and treated via synchronous audio and visual telemedicine encounter.      Reason for Telemedicine Visit: Covid 19    Originating Site (Patient Location): Patient's home    Distant Site (Provider Location): Provider Remote Setting    Consent:  The patient/guardian has verbally consented to: the potential risks and benefits of telemedicine (video visit) versus in person care; bill my insurance or make self-payment for services provided; and responsibility for payment of non-covered services.     Mode of Communication:  Video Conference via [employer]    As the provider I attest to compliance with applicable laws and regulations related to telemedicine.      [facility] Sexual Health -  Case Progress Note    Client Name: Katy Jean  YOB: 1975  MRN:  [id number]  Treating Provider: Michelle Guidry LP  Type of Session: Individual  Present in Session: client  Number of Minutes:  57    Date of Service::7/01/20        Current Symptoms/Status:    Anxiety, worry, distressing conflict and marital relationship, mistrust of her , distress about his long-term pattern of engaging in sexual behavior outside the relationship, anxiety and fear about his expression of anger if she does something he does not like.    Progress Toward Treatment Goals:   Completed treatment plan.    Intervention: Modality and Description:  Provided support and feedback.  Used CBT to process ongoing anxiety and depression related to 's pattern of being sexual outside the marriage.  She shared she did attend the meeting with his therapist on Friday.  She shared that her  was on video inside  the home and she was not audio inside the car.  She shared that she was not given time to share the concerns and information that she had outlined for this session.  She shared the new therapist asked a lot of questions and she does understand that she will be involved in future sessions.  We processed recent interaction with him.  Also discussed who she confided in.  She shared that her family members only no a limited amount of information and she feels concerned about telling them more.  We discussed how she can work on not getting engaged when he makes critical statements to her.  She discussed that this will be very difficult for her, but agreed to the guidelines and how to do it that we discussed.    Response to Intervention:  Client reported gaining insight and validation.    Assignment:  Change response to 's statements as discussed.    Interactive Complexity:  There are four specific communication difficulties that complicate the work of the primary psychiatric procedure.  Interactive complexity (+[id number]) may be reported when at least one of these difficulties is present.    Communication difficulties present during current the psychiatric procedure include:  1. None.    Diagnosis:  Encounter Diagnosis   Name Primary?     Adjustment disorder with mixed anxiety and depressed mood Yes         Plan / Need for Future Services:    TREATMENT PLAN    Date of Treatment Plan 2020  Name: Katy Jean  : 1975  Medical Record Number: [id number]  Treating Provider: Michelle Guidry PsyD, LP  Type of Session: Individual  Present in Session: client      Current Status:    Depression/Mood:  Sad  Irritable  Decreased Concentration    Anxiety/Panic:  Worry  Palpitations  Sweating  Fear Loss/Control    Thought:   Reports no problems or symptoms at this time    Sensorium:  Reports no problems or symptoms at this time    Behavior/Health:  Relationship Problems    Chemical Use:  Denies both  Alcohol and Drug Abuse    Sexual Problems:  Reports no problems or symptoms at this time    Suicide Risk Assessment:  Assessed Level of Immediate Risk:  YES  Ideation:  NO  Plan:  NO  Means:  NO  Intent:  NO    Homicide Risk Assessment:  Assessed Level of Immediate Risk:  YES  Ideation:  NO  Plan:  NO  Means:  NO  Intent:  NO    Impact of Symptoms on Function:  Decreased Physical/Health Status  Decreased Social/Family Function    DSM-5 Diagnoses:   Diagnoses       Codes Comments    Adjustment disorder with mixed anxiety and depressed mood    -  Primary F43.23           Screening Questionnaires:  Please indicate whether the following screening questionnaires have been completed:  CAGE:   PHQ-9:   BRAYAN-7: No  Safety Screening: Yes  WHODAS: No  Other:     Problem(s):  1. Anxiety, depression and distress in relationship due to long-term pattern of 's infidelity  2.   3.     Short-Long Term Goals  Increase Coping  Change Cognitions  Increase Psychosocial/Support Functioning  Improve Communication    Interventions  Cognitive-Behavioral Therapy  Behavior Therapy    Expected Outcomes and Prognosis  Return to normal functioning    Anticipated Treatment Duration:  Unknown    Frequency of Sessions  2 x / Month    Progress Update (for Plan Update Only)  NA:  1st Treatment Planning    Other Specific Goal Objectives for Treatment:  none    Current Psychoactive Medications:  Not Applicable    Discharge & Aftercare Goals: To Be Determined.    Care Coordination: Yes    Consent to Treatment:     Patient participated in this treatment planning process and indicated verbal agreement with the above treatment plan.    Patient Signature: verbal  Date: 7/01/2020    Provider Signature: Michelle Guidry PsyD, LP    Date: 7/01/2020        Return for therapy in 1-2 weeks.      Michelle Guidry PsyD, LP

## 2020-07-15 ENCOUNTER — VIRTUAL VISIT (OUTPATIENT)
Dept: OTHER | Facility: OUTPATIENT CENTER | Age: 45
End: 2020-07-15
Payer: COMMERCIAL

## 2020-07-15 DIAGNOSIS — F43.23 ADJUSTMENT DISORDER WITH MIXED ANXIETY AND DEPRESSED MOOD: Primary | ICD-10-CM

## 2020-07-20 NOTE — PROGRESS NOTES
[facility] Sexual Health -  Case Progress Note      Client Name: Katy Jean  YOB: 1975  MRN:  [id number]  Treating Provider: Michelle Guidry LP  Type of Session:   Video start time:12:02 am  Video end time: 12:58 am    Telemedicine Visit: The patient's condition can be safely assessed and treated via synchronous audio and visual telemedicine encounter.      Reason for Telemedicine Visit: Covid 19    Originating Site (Patient Location): Patient's home    Distant Site (Provider Location): Provider Remote Setting    Consent:  The patient/guardian has verbally consented to: the potential risks and benefits of telemedicine (video visit) versus in person care; bill my insurance or make self-payment for services provided; and responsibility for payment of non-covered services.     Mode of Communication:  Video Conference via [employer] We were disconnected part way through session and completed with telephone session.    As the provider I attest to compliance with applicable laws and regulations related to telemedicine.      [facility] Sexual Health -  Case Progress Note    Client Name: Katy Jean  YOB: 1975  MRN:  [id number]  Treating Provider: Michelle Guidry LP  Type of Session: Individual  Present in Session: client  Number of Minutes:  57    Date of Service::7/15/20        Current Symptoms/Status:    Intense anxiety, feeling overwhelmed, extreme distress in relationship, feeling as if no one believes her about her , fear and worry about what he will do if she tries to get out of the relationship, anxiety, worry, distressing conflict and marital relationship, mistrust of her , distress about his long-term pattern of engaging in sexual behavior outside the relationship, anxiety and fear about his expression of anger if she does something he does not like.    Progress Toward Treatment Goals:   Client reported some progress with stopping  herself from engaging in conflict when it escalates.    Intervention: Modality and Description:  Provided support and feedback.  Used CBT to process intense anxiety and distress about her marital relationship.  She shared that after her  had his first individual therapy session with his therapist things became much worse for her.  Her  told her that the therapist agreed with him about not moving out of the house.  Her  also said that he received positive feedback about the work that he has been doing.  Her  also asked her for a list of things that she wants or needs from the relationship.  Client started sobbing before she could complete sharing this information with me.  She shared it was her fear that her  would not be held accountable for his behaviors and once again he would be able to use this to manipulate her.  She sobbed throughout most of the session talking about how difficult it is going to be to talk with him.  She shared that he has not done any work or made any changes and yet he is able to make it really therapists and get positive feedback.  She shared knowing that she needs to get out of the relationship but fears his response to her taking steps to leave.  She believes that he will try to manipulate her and the children in every way possible to make this difficult for her.  She shared in the past she has just gotten to the point of giving up and staying because it was easier.  We discussed not engaging in debates with him about these topics.  Encouraged her to seek out the support of her brothers and share more with them about what is going on in her home.  Also discussed just starting with consulting an  about her rights and the process.  Encouraged client to contact me if she needs another session or support prior to our next meeting.    Response to Intervention:  Client reported gaining insight and validation.    Assignment:  Change response to  's statements as discussed.    Interactive Complexity:  There are four specific communication difficulties that complicate the work of the primary psychiatric procedure.  Interactive complexity (+[id number]) may be reported when at least one of these difficulties is present.    Communication difficulties present during current the psychiatric procedure include:  1. None.    Diagnosis:  Encounter Diagnosis   Name Primary?     Adjustment disorder with mixed anxiety and depressed mood Yes         Plan / Need for Future Services:      Return for therapy in 1-2 weeks.      Michelle Guidry PsyD, LP

## 2020-08-04 ENCOUNTER — VIRTUAL VISIT (OUTPATIENT)
Dept: OTHER | Facility: OUTPATIENT CENTER | Age: 45
End: 2020-08-04
Payer: COMMERCIAL

## 2020-08-04 DIAGNOSIS — F43.23 ADJUSTMENT DISORDER WITH MIXED ANXIETY AND DEPRESSED MOOD: Primary | ICD-10-CM

## 2020-08-05 NOTE — PROGRESS NOTES
[facility] Sexual Health -  Case Progress Note      Client Name: Katy Jean  YOB: 1975  MRN:  [id number]  Treating Provider: Michelle Guidry LP  Type of Session:   Video start time:12:02 am  Video end time: 12:58 am    Telemedicine Visit: The patient's condition can be safely assessed and treated via synchronous audio and visual telemedicine encounter.      Reason for Telemedicine Visit: Covid 19    Originating Site (Patient Location): Patient's home    Distant Site (Provider Location): Provider Remote Setting    Consent:  The patient/guardian has verbally consented to: the potential risks and benefits of telemedicine (video visit) versus in person care; bill my insurance or make self-payment for services provided; and responsibility for payment of non-covered services.     Mode of Communication:  Video Conference via [employer] We were disconnected part way through session and completed with telephone session.    As the provider I attest to compliance with applicable laws and regulations related to telemedicine.      [facility] Sexual Health -  Case Progress Note    Client Name: Katy Jean  YOB: 1975  MRN:  [id number]  Treating Provider: Michelle Guidry LP  Type of Session: Individual  Present in Session: client  Number of Minutes:  57    Date of Service::8/04/20        Current Symptoms/Status:    Intense anxiety, feeling overwhelmed, extreme distress in relationship, anger and frustration with therapy process, a sense of her  being more manipulative, feeling as if no one believes her about her , fear and worry about what he will do if she tries to get out of the relationship, anxiety, worry, distressing conflict and marital relationship, mistrust of her , distress about his long-term pattern of engaging in sexual behavior outside the relationship, anxiety and fear about his expression of anger if she does something he does not  like.    Progress Toward Treatment Goals:   Client reported some progress with questioning the therapy process for herself and her .    Intervention: Modality and Description:  Provided support and feedback.  Used CBT to process intense anxiety and distress about her marital relationship.  She shared being extremely frustrated, sad, and hurt regarding her 's behavior.  She shared feeling really angry with the treatment process as she does not think the therapist is holding him accountable for his behavior.  She had a number of things that she wanted to share about what her  has said to her about his therapy and how upset that she is with him getting the messages he is getting from therapy.  She shared a number of examples of behaviors that are problematic and have become worse.  She feels like that what is needed is her  needs a therapist that holds him accountable as happened with his week of treatment with a this therapist.  She reviewed the report that was written by this therapist and stated her 's current therapist is not even addressing her focused on any of these issues.  It seems as if her focus was on convincing me of her 's problems and that he needs to be held accountable.  Affirmed this information with the client but talked about what she is observing with his patterns, how long standing they are, and her needing to use this information in her decision-making process.  Asked the client several times during the session what she wanted me to do that help her with this and she asked that I communicate with her 's therapist to not talk about her or the relationship during sessions I told her I could pass this information on.  She is questioning continuing with therapy because she really thinks the focus needs to be on holding her  accountable.  She is not sure that she will schedule further sessions and validated that she should do what is best for her  and I would pass the information on.    Response to Intervention:  Client reported gaining insight and validation.    Assignment:  Change response to 's statements as discussed.    Interactive Complexity:  There are four specific communication difficulties that complicate the work of the primary psychiatric procedure.  Interactive complexity (+[id number]) may be reported when at least one of these difficulties is present.    Communication difficulties present during current the psychiatric procedure include:  1. None.    Diagnosis:  Encounter Diagnosis   Name Primary?     Adjustment disorder with mixed anxiety and depressed mood Yes         Plan / Need for Future Services:    Client is uncertain if she will return for therapy so at this time there are no more appointments scheduled.    Michelle Guidry PsyD, LP

## 2020-11-22 ENCOUNTER — HEALTH MAINTENANCE LETTER (OUTPATIENT)
Age: 45
End: 2020-11-22

## 2021-02-13 ENCOUNTER — HEALTH MAINTENANCE LETTER (OUTPATIENT)
Age: 46
End: 2021-02-13

## 2021-04-10 ENCOUNTER — HEALTH MAINTENANCE LETTER (OUTPATIENT)
Age: 46
End: 2021-04-10

## 2021-09-19 ENCOUNTER — HEALTH MAINTENANCE LETTER (OUTPATIENT)
Age: 46
End: 2021-09-19

## 2022-05-01 ENCOUNTER — HEALTH MAINTENANCE LETTER (OUTPATIENT)
Age: 47
End: 2022-05-01

## 2022-11-21 ENCOUNTER — HEALTH MAINTENANCE LETTER (OUTPATIENT)
Age: 47
End: 2022-11-21

## 2023-06-02 ENCOUNTER — HEALTH MAINTENANCE LETTER (OUTPATIENT)
Age: 48
End: 2023-06-02

## 2023-11-25 ENCOUNTER — HEALTH MAINTENANCE LETTER (OUTPATIENT)
Age: 48
End: 2023-11-25

## 2023-11-30 ENCOUNTER — LAB REQUISITION (OUTPATIENT)
Dept: LAB | Facility: CLINIC | Age: 48
End: 2023-11-30
Payer: COMMERCIAL

## 2023-11-30 DIAGNOSIS — Z01.419 ENCOUNTER FOR GYNECOLOGICAL EXAMINATION (GENERAL) (ROUTINE) WITHOUT ABNORMAL FINDINGS: ICD-10-CM

## 2023-11-30 DIAGNOSIS — E34.9 ENDOCRINE DISORDER, UNSPECIFIED: ICD-10-CM

## 2023-11-30 LAB
BASOPHILS # BLD AUTO: 0 10E3/UL (ref 0–0.2)
BASOPHILS NFR BLD AUTO: 1 %
EOSINOPHIL # BLD AUTO: 0.3 10E3/UL (ref 0–0.7)
EOSINOPHIL NFR BLD AUTO: 4 %
ERYTHROCYTE [DISTWIDTH] IN BLOOD BY AUTOMATED COUNT: 13.4 % (ref 10–15)
HBA1C MFR BLD: 5.8 %
HCT VFR BLD AUTO: 38.9 % (ref 35–47)
HGB BLD-MCNC: 12.8 G/DL (ref 11.7–15.7)
IMM GRANULOCYTES # BLD: 0 10E3/UL
IMM GRANULOCYTES NFR BLD: 0 %
LYMPHOCYTES # BLD AUTO: 1.8 10E3/UL (ref 0.8–5.3)
LYMPHOCYTES NFR BLD AUTO: 31 %
MCH RBC QN AUTO: 30.5 PG (ref 26.5–33)
MCHC RBC AUTO-ENTMCNC: 32.9 G/DL (ref 31.5–36.5)
MCV RBC AUTO: 93 FL (ref 78–100)
MONOCYTES # BLD AUTO: 0.6 10E3/UL (ref 0–1.3)
MONOCYTES NFR BLD AUTO: 9 %
NEUTROPHILS # BLD AUTO: 3.2 10E3/UL (ref 1.6–8.3)
NEUTROPHILS NFR BLD AUTO: 55 %
NRBC # BLD AUTO: 0 10E3/UL
NRBC BLD AUTO-RTO: 0 /100
PLATELET # BLD AUTO: 266 10E3/UL (ref 150–450)
RBC # BLD AUTO: 4.19 10E6/UL (ref 3.8–5.2)
WBC # BLD AUTO: 5.9 10E3/UL (ref 4–11)

## 2023-11-30 PROCEDURE — 84439 ASSAY OF FREE THYROXINE: CPT | Mod: ORL | Performed by: OBSTETRICS & GYNECOLOGY

## 2023-11-30 PROCEDURE — 83525 ASSAY OF INSULIN: CPT | Mod: ORL | Performed by: OBSTETRICS & GYNECOLOGY

## 2023-11-30 PROCEDURE — 82728 ASSAY OF FERRITIN: CPT | Mod: ORL | Performed by: OBSTETRICS & GYNECOLOGY

## 2023-11-30 PROCEDURE — 87624 HPV HI-RISK TYP POOLED RSLT: CPT | Mod: ORL | Performed by: OBSTETRICS & GYNECOLOGY

## 2023-11-30 PROCEDURE — 84443 ASSAY THYROID STIM HORMONE: CPT | Mod: ORL | Performed by: OBSTETRICS & GYNECOLOGY

## 2023-11-30 PROCEDURE — 85025 COMPLETE CBC W/AUTO DIFF WBC: CPT | Mod: ORL | Performed by: OBSTETRICS & GYNECOLOGY

## 2023-11-30 PROCEDURE — 84480 ASSAY TRIIODOTHYRONINE (T3): CPT | Mod: ORL | Performed by: OBSTETRICS & GYNECOLOGY

## 2023-11-30 PROCEDURE — G0145 SCR C/V CYTO,THINLAYER,RESCR: HCPCS | Mod: ORL | Performed by: OBSTETRICS & GYNECOLOGY

## 2023-11-30 PROCEDURE — 83036 HEMOGLOBIN GLYCOSYLATED A1C: CPT | Mod: ORL | Performed by: OBSTETRICS & GYNECOLOGY

## 2023-12-01 LAB
FERRITIN SERPL-MCNC: 47 NG/ML (ref 6–175)
INSULIN SERPL-ACNC: 6.5 UU/ML (ref 2.6–24.9)
T3 SERPL-MCNC: 88 NG/DL (ref 85–202)
T4 FREE SERPL-MCNC: 1.16 NG/DL (ref 0.9–1.7)
TSH SERPL DL<=0.005 MIU/L-ACNC: 2.07 UIU/ML (ref 0.3–4.2)

## 2023-12-04 LAB
BKR LAB AP GYN ADEQUACY: NORMAL
BKR LAB AP GYN INTERPRETATION: NORMAL
BKR LAB AP HPV REFLEX: NORMAL
BKR LAB AP LMP: NORMAL
BKR LAB AP PREVIOUS ABNL DX: NORMAL
BKR LAB AP PREVIOUS ABNORMAL: NORMAL
PATH REPORT.COMMENTS IMP SPEC: NORMAL
PATH REPORT.COMMENTS IMP SPEC: NORMAL
PATH REPORT.RELEVANT HX SPEC: NORMAL

## 2023-12-06 LAB
HUMAN PAPILLOMA VIRUS 16 DNA: NEGATIVE
HUMAN PAPILLOMA VIRUS 18 DNA: NEGATIVE
HUMAN PAPILLOMA VIRUS FINAL DIAGNOSIS: NORMAL
HUMAN PAPILLOMA VIRUS OTHER HR: NEGATIVE